# Patient Record
Sex: FEMALE | Race: WHITE
[De-identification: names, ages, dates, MRNs, and addresses within clinical notes are randomized per-mention and may not be internally consistent; named-entity substitution may affect disease eponyms.]

---

## 2017-06-21 ENCOUNTER — HOSPITAL ENCOUNTER (EMERGENCY)
Dept: HOSPITAL 74 - FER | Age: 37
Discharge: HOME | End: 2017-06-21
Payer: COMMERCIAL

## 2017-06-21 VITALS — HEART RATE: 64 BPM | SYSTOLIC BLOOD PRESSURE: 111 MMHG | DIASTOLIC BLOOD PRESSURE: 78 MMHG | TEMPERATURE: 98.5 F

## 2017-06-21 VITALS — BODY MASS INDEX: 24.6 KG/M2

## 2017-06-21 DIAGNOSIS — M79.89: Primary | ICD-10-CM

## 2017-06-21 NOTE — PDOC
History of Present Illness





- General


History Source: Patient


Exam Limitations: No Limitations





- History of Present Illness


Initial Comments: 





06/21/17 20:13


The patient is a 37 year old female with no significant past medical history, 

who is sent to the ED by her allergist for left ankle swelling today. Patient 

states she has been seeing an allergist for chronic hives for the past two 

months. She states she has been experiencing hives ever since she came back 

from [place] two months ago. Her allergist sent her in asking for a sonogram. 


Denies calf pain.


Denies SOB, fever, chills, body aches. 


Denies family history of blood clots. 





PAST MEDICAL HISTORY: no significant history


PAST SURGICAL HISTORY: no significant history


FAMILY HISTORY: no pertinent history


SOCIAL HISTORY: Pt lives with family and is employed.


MEDICATIONS: reviewed


ALLERGIES: As per nursing notes


 


ROS


General: No fevers or chills, no weakness, no weight loss


HEENT: No change in vision. No sore throat,. No ear pain


CardioVascular: No chest pain or shortness of breath


Respiratory:No cough, or wheezing.


Gastrointestinal: no nausea, vomiting, diarrhea or constipation, No rectal 

bleeding


Genitourinary: No dysuria, hematuria, or frequency


Musculoskeletal: + left ankle swelling. No joint or muscle pain.


Neurologic: No headache, vertigo, dizziness or loss of consciousness


Psychiatric: nor depression


Skin: + hives


Endocrine: no increased thirst or abnormal weight change


Allergic: no skin or latex allergy


All other systems reviewed and normal


 


Exam:


General: Well-nourished well-developed individual, no acute distress


HEENT: Throat: Normal, tonsils normal, no erythema or exudate


Neck: Supple, no meningeal signs, no lymphadenopathy


Eyes::Pupils equal reactive and round, extraocular motion intact


Chest: Nontender to palpation


Cardiac: S1-S2 normal, regular rate and rhythm, no murmurs rubs or gallops


Respiratory: Lungs clear to auscultation bilateral


Abdomen: Soft, nondistended, normal bowel sounds, nontender to palpation 

diffusely


Extremities: Left ankle, non pitting edema. No tenderness on palpation of the 

calf muscles. no tender palpable cord. Foot is warm. No bony tenderness. 


Skin: No rashes


Neuro: Alert and oriented x3, nonfocal exam, grossly intact, normal gait


Psych: Normal mood and affect








<Samson Atkinson - Last Filed: 06/21/17 20:13>





- General


History Source: Patient


Exam Limitations: No Limitations





- History of Present Illness


Initial Comments: 


06/21/17 20:48





A portion of this note was documented by scribe services under my direction. I 

have reviewed the details of the note, within reason, and agree with the 

documentation.  The case summary and management plan written by me. 





Ultrasound negative for DVT








Assessment and plan:  This is a 37-year-old female who came in from her 

allergist office for a left lower extremity that was swollen times 

approximately one week. Patient denied any trauma or injury. Patient denied any 

significant pain. Patient did have some recent travel approximately 6 weeks ago 

to [place]. Patient otherwise is healthy. Patient denies history of DVT in the 

past. Patient denies any family history of DVTs or any other risk factors for 

DVTs.





Patient had an ultrasound that was negative for DVT





Patient was referred back to her primary care doctor for additional evaluation 

and workup.








<Yas Yost - Last Filed: 06/21/17 20:52>





- General


Chief Complaint: Pain, Acute


Stated Complaint: LEFT ANKLE PAIN/SWELLING


Time Seen by Provider: 06/21/17 19:56





Past History





<Smason Atkinson - Last Filed: 06/21/17 20:13>





- Past Medical History


Other medical history: DENIES





- Psycho/Social/Smoking Cessation Hx


Anxiety: No


Suicidal Ideation: No


Smoking History: Never smoked


Hx Alcohol Use: Yes


Drug/Substance Use Hx: No


Substance Use Type: Alcohol





<Yas Yost - Last Filed: 06/21/17 20:52>





- Past Medical History


Allergies/Adverse Reactions: 


 Allergies











Allergy/AdvReac Type Severity Reaction Status Date / Time


 


No Known Allergies Allergy   Verified 06/21/17 19:55











Home Medications: 


Ambulatory Orders





Levothyroxine [Synthroid -] 125 mcg PO DAILY 06/21/17 











*Physical Exam





- Vital Signs


 Last Vital Signs











Temp Pulse Resp BP Pulse Ox


 


 98.5 F   64   18   111/78   100 


 


 06/21/17 19:50  06/21/17 19:50  06/21/17 19:50  06/21/17 19:50  06/21/17 19:50














<Samson Atkinson - Last Filed: 06/21/17 20:13>





- Vital Signs


 Last Vital Signs











Temp Pulse Resp BP Pulse Ox


 


 98.5 F   64   18   111/78   100 


 


 06/21/17 19:50  06/21/17 19:50  06/21/17 19:50  06/21/17 19:50  06/21/17 19:50














<Yas Yost - Last Filed: 06/21/17 20:52>





*DC/Admit/Observation/Transfer





- Attestations


Scribe Attestion: 





06/21/17 20:17





Documentation prepared by Samson Atkinson, acting as medical scribe for 

Yas Yost MD.





<Samson Atkinson - Last Filed: 06/21/17 20:13>





- Discharge Dispostion


Admit: No





<Yas Yost - Last Filed: 06/21/17 20:52>


Diagnosis at time of Disposition: 


 Swelling of left lower extremity





- Discharge Dispostion


Disposition: HOME


Condition at time of disposition: Stable





- Patient Instructions


Additional Instructions: 


It is important that you follow-up with your primary care doctor for further 

evaluation and workup of your leg swelling.





Return to the emergency department immediately with ANY new, persistent or 

worsening symptoms.





Continue any medications as previously prescribed by your physician.





You should follow up with your primary doctor as soon as possible regarding 

today's emergency department visit.


.


Please make sure your doctor reviews the results of your emergency evaluation.





Thank you for coming to the   Emergency Department today for your care. It was 

a pleasure to see you today. Please note that your evaluation is INCOMPLETE 

until you  follow-up with your doctor.

## 2020-01-27 ENCOUNTER — RESULT REVIEW (OUTPATIENT)
Age: 40
End: 2020-01-27